# Patient Record
Sex: MALE | Race: WHITE | NOT HISPANIC OR LATINO
[De-identification: names, ages, dates, MRNs, and addresses within clinical notes are randomized per-mention and may not be internally consistent; named-entity substitution may affect disease eponyms.]

---

## 2019-02-05 ENCOUNTER — APPOINTMENT (OUTPATIENT)
Dept: GASTROENTEROLOGY | Facility: CLINIC | Age: 24
End: 2019-02-05
Payer: COMMERCIAL

## 2019-02-05 VITALS
HEART RATE: 80 BPM | DIASTOLIC BLOOD PRESSURE: 80 MMHG | SYSTOLIC BLOOD PRESSURE: 141 MMHG | WEIGHT: 160 LBS | BODY MASS INDEX: 21.2 KG/M2 | HEIGHT: 73 IN | OXYGEN SATURATION: 97 %

## 2019-02-05 PROBLEM — Z00.00 ENCOUNTER FOR PREVENTIVE HEALTH EXAMINATION: Status: ACTIVE | Noted: 2019-02-05

## 2019-02-05 PROCEDURE — 36415 COLL VENOUS BLD VENIPUNCTURE: CPT

## 2019-02-05 PROCEDURE — 99244 OFF/OP CNSLTJ NEW/EST MOD 40: CPT | Mod: 25

## 2019-02-05 NOTE — HISTORY OF PRESENT ILLNESS
[FreeTextEntry1] : 24m no sig PMH presents for 4-5wk hx sx starting w/ constipation, w/ some assoc BRBPR w/ BMs about mid 12/2018.  Pt stated to have persistent sx - started on linzess by PMD (minimal improvement in BMs) and then amitiza - leading to stomach cramps over the weekend, prompting call to be seen today.  No fever/n/v.  Has had loose stools w/ tenesmus in last 2-3d since starting amitiza.  Some LUQ and epigastric discomfort over same time.  No n/v.  States he was going through a period of stress about same time (mother sick) and started having more EtOH at college.  No melena, NSAIDs.  \par \par Soc:  no tobacco or significant EtOH\par FHx: no FHx GI malignancy or IBD\par \par ROS:\par Constitutional:: no weight loss, fevers\par ENT: no deafness\par Eyes: not blind\par Neck: no LN\par Chest: no dyspnea/cough\par Cardiac: no chest pain\par Vascular: no leg swelling\par GI: no abdominal pain, nausea, vomiting, diarrhea, constipation, rectal bleeding, dysphagia, melena unless otherwise noted in HPI\par : no dysuria, dark urine\par Skin: no rashes, jaundice\par Heme: no bleeding\par \par Px: (VS noted below)\par General: NAD\par Eyes: anicteric\par Oropharynx:  clear\par Neck: no LN\par Chest: normal respiratory effort\par CVS: regular\par Abd: soft, NT, ND, +BS, no HSM\par Ext: no atrophy\par Neuro: grossly nonfocal\par Rectal: mucus/blood.  No fissure/hemorrhoids.

## 2019-02-05 NOTE — CONSULT LETTER
[Dear  ___] : Dear  [unfilled], [Consult Letter:] : I had the pleasure of evaluating your patient, [unfilled]. [Please see my note below.] : Please see my note below. [Consult Closing:] : Thank you very much for allowing me to participate in the care of this patient.  If you have any questions, please do not hesitate to contact me. [Sincerely,] : Sincerely, [FreeTextEntry3] : Manuelito Parnell MD\par

## 2019-02-05 NOTE — ASSESSMENT
[FreeTextEntry1] : 1) rectal bleed - possibly hemorrhoidal - from  constipation - though now sx seem more like proctitis, as no findings on external exam.  Tx course w/ bowel regimen may also be contributing to bleeding.  Will check labs and consider colonoscopy - hold off on txing constipation of diarrhea until his course is more reliably not tx-related.\par \par 2) epigastric pain - will start ppi for now, though suspect related to constipation hx

## 2019-02-06 LAB
BASOPHILS # BLD AUTO: 0.06 K/UL
BASOPHILS NFR BLD AUTO: 1.3 %
EOSINOPHIL # BLD AUTO: 0.16 K/UL
EOSINOPHIL NFR BLD AUTO: 3.4 %
HCT VFR BLD CALC: 45.6 %
HGB BLD-MCNC: 14.9 G/DL
IMM GRANULOCYTES NFR BLD AUTO: 0.4 %
LYMPHOCYTES # BLD AUTO: 1.59 K/UL
LYMPHOCYTES NFR BLD AUTO: 34 %
MAN DIFF?: NORMAL
MCHC RBC-ENTMCNC: 28.7 PG
MCHC RBC-ENTMCNC: 32.7 GM/DL
MCV RBC AUTO: 87.9 FL
MONOCYTES # BLD AUTO: 0.61 K/UL
MONOCYTES NFR BLD AUTO: 13.1 %
NEUTROPHILS # BLD AUTO: 2.23 K/UL
NEUTROPHILS NFR BLD AUTO: 47.8 %
PLATELET # BLD AUTO: 178 K/UL
RBC # BLD: 5.19 M/UL
RBC # FLD: 13.3 %
WBC # FLD AUTO: 4.67 K/UL

## 2019-02-07 LAB
ALBUMIN SERPL ELPH-MCNC: 4.6 G/DL
ALP BLD-CCNC: 79 U/L
ALT SERPL-CCNC: 16 U/L
ANION GAP SERPL CALC-SCNC: 14 MMOL/L
AST SERPL-CCNC: 22 U/L
BILIRUB SERPL-MCNC: 0.5 MG/DL
BUN SERPL-MCNC: 12 MG/DL
CALCIUM SERPL-MCNC: 9.9 MG/DL
CHLORIDE SERPL-SCNC: 102 MMOL/L
CO2 SERPL-SCNC: 27 MMOL/L
CREAT SERPL-MCNC: 0.85 MG/DL
ERYTHROCYTE [SEDIMENTATION RATE] IN BLOOD BY WESTERGREN METHOD: 22 MM/HR
GLUCOSE SERPL-MCNC: 91 MG/DL
IGA SER QL IEP: 262 MG/DL
LPL SERPL-CCNC: 21 U/L
POTASSIUM SERPL-SCNC: 4.5 MMOL/L
PROT SERPL-MCNC: 7.6 G/DL
SODIUM SERPL-SCNC: 143 MMOL/L

## 2019-02-11 LAB
GLIADIN IGA SER QL: <5 UNITS
GLIADIN IGG SER QL: <5 UNITS
GLIADIN PEPTIDE IGA SER-ACNC: NEGATIVE
GLIADIN PEPTIDE IGG SER-ACNC: NEGATIVE
TTG IGA SER IA-ACNC: <5 UNITS
TTG IGA SER-ACNC: NEGATIVE
TTG IGG SER IA-ACNC: <5 UNITS
TTG IGG SER IA-ACNC: NEGATIVE

## 2019-02-25 ENCOUNTER — CLINICAL ADVICE (OUTPATIENT)
Age: 24
End: 2019-02-25

## 2019-03-15 ENCOUNTER — APPOINTMENT (OUTPATIENT)
Dept: GASTROENTEROLOGY | Facility: CLINIC | Age: 24
End: 2019-03-15
Payer: COMMERCIAL

## 2019-03-15 VITALS
HEIGHT: 73 IN | SYSTOLIC BLOOD PRESSURE: 130 MMHG | DIASTOLIC BLOOD PRESSURE: 70 MMHG | BODY MASS INDEX: 20.54 KG/M2 | WEIGHT: 155 LBS | HEART RATE: 80 BPM

## 2019-03-15 PROCEDURE — 99213 OFFICE O/P EST LOW 20 MIN: CPT | Mod: 25

## 2019-03-15 PROCEDURE — 36415 COLL VENOUS BLD VENIPUNCTURE: CPT

## 2019-03-15 NOTE — ASSESSMENT
[FreeTextEntry1] : 1) rectal bleed - possibly hemorrhoidal - from  constipation - improving.  Can't r/o procitis/colitis, though this would likely persist.  Will repeat labs and if sx cont resolved, will hold off on colonoscopy.\par \par 2) epigastric pain - resolved

## 2019-03-15 NOTE — HISTORY OF PRESENT ILLNESS
[FreeTextEntry1] : 24m no sig PMH presents seen last month for constipation/brbpr off/on x 1mo - assoc w/ vague abd cramps, relieved by BMs - MARQUIS was unremarkable at that time.  He had some loose stools after tx by PMD w/ linzess.  CBC, CMP, lipase, celiac abs all unremarkable at that time, sed rate 20s.  We had discussed colonscopy to ensure no proctitis/colitis, but deferred for trial of high fiber diet, as he was otherwise slowly improving.  He has since improved more and he has had cessation of any bleeding in the last wk.  no further abd pains.  No melena/wt loss/fevers.  No joint pains/rashes.\par \par Stated last visit he was going through a period of stress about same time (mother sick) and started having more EtOH at college.  No melena, NSAIDs.  \par \par Soc:  no tobacco or significant EtOH anymore\par FHx: no FHx GI malignancy or IBD\par \par ROS:\par Constitutional:: no weight loss, fevers\par ENT: no deafness\par Eyes: not blind\par Neck: no LN\par Chest: no dyspnea/cough\par Cardiac: no chest pain\par Vascular: no leg swelling\par GI: no abdominal pain, nausea, vomiting, diarrhea, constipation, rectal bleeding, dysphagia, melena unless otherwise noted in HPI\par : no dysuria, dark urine\par Skin: no rashes, jaundice\par Heme: no bleeding\par \par Px: (VS noted below)\par General: NAD\par Eyes: anicteric\par Oropharynx:  clear\par Neck: no LN\par Chest: normal respiratory effort\par CVS: regular\par Abd: soft, NT, ND, +BS, no HSM\par Ext: no atrophy\par Neuro: grossly nonfocal

## 2019-03-19 LAB
BASOPHILS # BLD AUTO: 0.07 K/UL
BASOPHILS NFR BLD AUTO: 1.4 %
EOSINOPHIL # BLD AUTO: 0.09 K/UL
EOSINOPHIL NFR BLD AUTO: 1.8 %
ERYTHROCYTE [SEDIMENTATION RATE] IN BLOOD BY WESTERGREN METHOD: 10 MM/HR
HCT VFR BLD CALC: 43.7 %
HGB BLD-MCNC: 13.8 G/DL
IMM GRANULOCYTES NFR BLD AUTO: 0.2 %
LYMPHOCYTES # BLD AUTO: 1.49 K/UL
LYMPHOCYTES NFR BLD AUTO: 29.7 %
MAN DIFF?: NORMAL
MCHC RBC-ENTMCNC: 29.1 PG
MCHC RBC-ENTMCNC: 31.6 GM/DL
MCV RBC AUTO: 92.2 FL
MONOCYTES # BLD AUTO: 0.53 K/UL
MONOCYTES NFR BLD AUTO: 10.6 %
NEUTROPHILS # BLD AUTO: 2.82 K/UL
NEUTROPHILS NFR BLD AUTO: 56.3 %
PLATELET # BLD AUTO: 206 K/UL
RBC # BLD: 4.74 M/UL
RBC # FLD: 13 %
WBC # FLD AUTO: 5.01 K/UL

## 2020-01-29 ENCOUNTER — APPOINTMENT (OUTPATIENT)
Dept: GASTROENTEROLOGY | Facility: CLINIC | Age: 25
End: 2020-01-29
Payer: COMMERCIAL

## 2020-01-29 VITALS
HEART RATE: 67 BPM | HEIGHT: 73 IN | SYSTOLIC BLOOD PRESSURE: 110 MMHG | DIASTOLIC BLOOD PRESSURE: 80 MMHG | WEIGHT: 155 LBS | BODY MASS INDEX: 20.54 KG/M2

## 2020-01-29 PROCEDURE — 99214 OFFICE O/P EST MOD 30 MIN: CPT

## 2020-01-29 NOTE — ASSESSMENT
[FreeTextEntry1] : 1) rectal bleed - possibly hemorrhoidal - in light of persistence, will plan colonoscopy.  \par \par 2) Constipation - reviewed dietary and lifestyle modifications, including increased fluid, fiber intake, as well as habituation / following urge to defecate, cutting back on bread/pasta, and to consider starting fiber supplement (eg.,  psyllium)\par \par 3) epigastric pain - now more LUQ - trial ppi and egd at colonoscopy if not improving.

## 2020-01-29 NOTE — HISTORY OF PRESENT ILLNESS
[FreeTextEntry1] : 25m seen in past for constipation/brbpr off/on x 1mo - labs, sed rate celiac, CMET, CBC, unrevealing exc transient marginally elevated sed rate - here for f/u.\par \par We had deferred colonoscopy last visit b/c sx resolved.  Now states sx persist, daily with brbpr, no anal pain, but LUQ pain off/on worse w/ oral intake, w/o any NSAID hx.  No fever/wt loss.  BMs 3-5 formed/day, incomplete. MARQUIS at prior visit unremarkable.  No melena/wt loss/fevers.  No joint pains/rashes.\par Starting new job in a week.\par \par Soc:  no tobacco or significant EtOH anymore\par FHx: no FHx GI malignancy or IBD\par \par ROS:\par Constitutional:: no weight loss, fevers\par ENT: no deafness\par Eyes: not blind\par Neck: no LN\par Chest: no dyspnea/cough\par Cardiac: no chest pain\par Vascular: no leg swelling\par GI: no abdominal pain, nausea, vomiting, diarrhea, constipation, rectal bleeding, dysphagia, melena unless otherwise noted in HPI\par : no dysuria, dark urine\par Skin: no rashes, jaundice\par Heme: no bleeding\par \par Px: (VS noted below)\par General: NAD\par Eyes: anicteric\par Oropharynx:  clear\par Neck: no LN\par Chest: normal respiratory effort\par CVS: regular\par Abd: soft, NT, ND, +BS, no HSM\par Ext: no atrophy\par Neuro: grossly nonfocal

## 2020-02-03 ENCOUNTER — APPOINTMENT (OUTPATIENT)
Dept: GASTROENTEROLOGY | Facility: HOSPITAL | Age: 25
End: 2020-02-03

## 2020-03-15 ENCOUNTER — RESULT REVIEW (OUTPATIENT)
Age: 25
End: 2020-03-15

## 2020-03-16 ENCOUNTER — APPOINTMENT (OUTPATIENT)
Dept: GASTROENTEROLOGY | Facility: HOSPITAL | Age: 25
End: 2020-03-16

## 2020-03-26 ENCOUNTER — NON-APPOINTMENT (OUTPATIENT)
Age: 25
End: 2020-03-26

## 2020-08-28 ENCOUNTER — LABORATORY RESULT (OUTPATIENT)
Age: 25
End: 2020-08-28

## 2020-08-31 ENCOUNTER — TRANSCRIPTION ENCOUNTER (OUTPATIENT)
Age: 25
End: 2020-08-31

## 2020-08-31 ENCOUNTER — APPOINTMENT (OUTPATIENT)
Dept: GASTROENTEROLOGY | Facility: CLINIC | Age: 25
End: 2020-08-31
Payer: COMMERCIAL

## 2020-08-31 ENCOUNTER — APPOINTMENT (OUTPATIENT)
Dept: BARIATRICS | Facility: CLINIC | Age: 25
End: 2020-08-31
Payer: COMMERCIAL

## 2020-08-31 DIAGNOSIS — K59.00 CONSTIPATION, UNSPECIFIED: ICD-10-CM

## 2020-08-31 PROCEDURE — 99214 OFFICE O/P EST MOD 30 MIN: CPT | Mod: 95

## 2020-08-31 PROCEDURE — 99213 OFFICE O/P EST LOW 20 MIN: CPT | Mod: 95

## 2020-08-31 NOTE — ASSESSMENT
[FreeTextEntry1] : -proctitis - cont canasa suppository as he feels he is responding.  Reviewed my concern RE: wt loss portending more significant inflammation - not eager to step up to enema or oral mesalamine, though that was my initial recommendation.  Plan for now is to see how he does in next few days OFF imodium and if not convincingly improving or gaining wt, will start enema +/- oral tx.

## 2020-08-31 NOTE — HISTORY OF PRESENT ILLNESS
[Home] : at home, [unfilled] , at the time of the visit. [Medical Office: ([facility])___] : at the medical office located in  [Verbal consent obtained from patient] : the patient, [unfilled] [FreeTextEntry1] : A verbal consent was previously obtained from the patient for this specific telehealth visit (see attached document). Visit done from my office in [place], [place].\par \par \par Time started: 430p\par Time ended: 4:45p\par Total time (minutes): 25  and > 50% of the time spent in the encounter involved counseling and coordination of care for any or all of the diagnoses submitted \par \par CHIEF COMPLAINT: \par proctitis\par \par HPI:  \par 25m proctitis, for f/u.\par \par Initial w/u  '19 - labs, sed rate celiac, CMET, CBC, unrevealing exc transient marginally elevated sed rate - then recurred later in year, - LUQ pain f/on worse w/ oral intake, w/o any NSAID hx, No fever/wt loss. BMs 3-5 formed/day, incomplete. Ultimately, colonoscopy w/ proctitis dx 3/2020.  Did well on canasa.  Then pandemic.  Ran out meds.  Sx recurred 2-3 wks ago.  Came for labs last wk:\par 8/31/20 cbc, normal , ESR\par \par Had"rough weekend" but feels like he turned corner today.  However, did take imodium.  No fevers.\par +10 lb wt loss in 2 wks prior to starting tx.\par \par \par Soc:  no tobacco or significant EtOH\par FHx: no FHx GI malignancy or IBD\par \par ROS:\par Constitutional:: no weight loss, fevers\par ENT: no deafness\par Eyes: not blind\par Neck: no LN\par Chest: no dyspnea/cough\par Cardiac: no chest pain\par Vascular: no leg swelling\par GI: no abdominal pain, nausea, vomiting, diarrhea, constipation, rectal bleeding, dysphagia, melena unless otherwise noted in HPI\par : no dysuria, dark urine\par Skin: no rashes, jaundice\par Heme: no bleeding\par Endocrine: no DM unless otherwise stated in HPI\par \par Px: telehealth.  NAD\par \par Labs/imaging/prior endoscopic results reviewed to the extent available and noted in HP.  IA verbal consent was previously obtained from the patient for this specific telehealth visit (see attached document). Visit done from my office in [place], [place].   Pt at home. Others present: none. Privacy concerns addressed.\par \par

## 2020-09-01 VITALS — BODY MASS INDEX: 18.95 KG/M2 | HEIGHT: 73 IN | WEIGHT: 143 LBS

## 2020-09-01 PROBLEM — K59.00 CONSTIPATION, UNSPECIFIED CONSTIPATION TYPE: Status: ACTIVE | Noted: 2020-01-29

## 2020-09-08 ENCOUNTER — APPOINTMENT (OUTPATIENT)
Dept: BARIATRICS | Facility: CLINIC | Age: 25
End: 2020-09-08
Payer: COMMERCIAL

## 2020-09-08 PROCEDURE — 99213 OFFICE O/P EST LOW 20 MIN: CPT | Mod: 95

## 2020-09-11 ENCOUNTER — APPOINTMENT (OUTPATIENT)
Dept: GASTROENTEROLOGY | Facility: CLINIC | Age: 25
End: 2020-09-11
Payer: COMMERCIAL

## 2020-09-11 PROCEDURE — 99214 OFFICE O/P EST MOD 30 MIN: CPT | Mod: 95

## 2020-09-11 NOTE — HISTORY OF PRESENT ILLNESS
[Medical Office: ([facility])___] : at the medical office located in  [Home] : at home, [unfilled] , at the time of the visit. [FreeTextEntry1] : A verbal consent was previously obtained from the patient for this specific telehealth visit (see attached document). Visit done from my office in [place], [place].\par \par \par Time started: 410\par Time ended: 424\par Total time (minutes): 28  and > 50% of the time spent in the encounter involved counseling and coordination of care for any or all of the diagnoses submitted \par \par CHIEF COMPLAINT: \par proctitis\par \par HPI: \par 25m proctitis, for f/u.\par \par Sx not improved - still 10 trips to bathroom/day.  Diarrhea.  Another 5 trips w/ tenesmus. Some bleeding, no fevers, cramps w/ all bms.  Feels no weaker than last wk.  Able to eat more, drinking.  Has not lost more wt.  No dizziness/LH.  + Nocturnal BMs now.\par \par Initial w/u '19 - labs, sed rate celiac, CMET, CBC, unrevealing exc transient marginally elevated sed rate - then recurred later in year, - LUQ pain f/on worse w/ oral intake, w/o any NSAID hx, No fever/wt loss. BMs 3-5 formed/day, incomplete. Ultimately, colonoscopy w/ proctitis dx 3/2020. Did well on canasa. Then pandemic. Ran out meds. Sx recurred 3-4  wks ago. Came for labs 8/28/20 cbc, normal , ESR 32\par When we spoke 2 wks ago, felt like was turning corner back on suppositories and declined escalation of tx. \par \par Soc:  no tobacco or significant EtOH\par FHx: no FHx GI malignancy or IBD\par \par ROS:\par Constitutional:: no weight loss, fevers\par ENT: no deafness\par Eyes: not blind\par Neck: no LN\par Chest: no dyspnea/cough\par Cardiac: no chest pain\par Vascular: no leg swelling\par GI: no abdominal pain, nausea, vomiting, diarrhea, constipation, rectal bleeding, dysphagia, melena unless otherwise noted in HPI\par : no dysuria, dark urine\par Skin: no rashes, jaundice\par Heme: no bleeding\par Endocrine: no DM unless otherwise stated in HPI\par \par Px: telehealth.  NAD\par \par Labs/imaging/prior endoscopic results reviewed to the extent available and noted in HP.  IA verbal consent was previously obtained from the patient for this specific telehealth visit (see attached document). Visit done from my office in [place], [place].   Pt at home. Others present: none. Privacy concerns addressed.\par \par

## 2020-09-11 NOTE — ASSESSMENT
[FreeTextEntry1] : - colitis - suspect extension of involvement proximally.  Advised prednisone in light of severity of sx and now willing to escalate tx. Advised ER if no improvement in next 24hours (I called pt back with this recommendation).  Reviewed risks, benefits, alternatives to steroid treatment, including DM, cataracts, osteoporosis, hip fracture, AVN, weight gain, adrenal insufficiency, immunosuppression/infection, HTN, PUD, among others.  Patient expressed understanding and wished to proceed.  Encouraged fluids - drinking pedialyte. \par -f/u next wk

## 2020-09-15 ENCOUNTER — APPOINTMENT (OUTPATIENT)
Dept: BARIATRICS | Facility: CLINIC | Age: 25
End: 2020-09-15
Payer: COMMERCIAL

## 2020-09-15 PROCEDURE — 99213 OFFICE O/P EST LOW 20 MIN: CPT | Mod: 95

## 2020-09-16 ENCOUNTER — LABORATORY RESULT (OUTPATIENT)
Age: 25
End: 2020-09-16

## 2020-09-16 ENCOUNTER — APPOINTMENT (OUTPATIENT)
Dept: GASTROENTEROLOGY | Facility: CLINIC | Age: 25
End: 2020-09-16
Payer: COMMERCIAL

## 2020-09-16 VITALS
WEIGHT: 141 LBS | HEART RATE: 92 BPM | DIASTOLIC BLOOD PRESSURE: 60 MMHG | HEIGHT: 73 IN | BODY MASS INDEX: 18.69 KG/M2 | TEMPERATURE: 97.6 F | OXYGEN SATURATION: 99 % | SYSTOLIC BLOOD PRESSURE: 130 MMHG

## 2020-09-16 VITALS — HEIGHT: 73 IN | WEIGHT: 141 LBS | BODY MASS INDEX: 18.69 KG/M2

## 2020-09-16 PROCEDURE — 99214 OFFICE O/P EST MOD 30 MIN: CPT | Mod: 25

## 2020-09-16 PROCEDURE — 36415 COLL VENOUS BLD VENIPUNCTURE: CPT

## 2020-09-16 NOTE — HISTORY OF PRESENT ILLNESS
[FreeTextEntry1] : 25m UC proctitis dx 3/2020 f/u. \par \par UC proctitis dx 3/2020\par -did well on canasa suppository - ran out then sx flare - not responding to canasa (declined oral tx at first) with BMs >15x/d w/ bleeding, wt loss, so started prednisone last wk - BMs down to 5x/d, no nocturnal BMs, and cramping inAMs only.  Still some brbpr.  No fevers.  Some wt gained back, but still perhaps 10-15lbs below baseline. \par \par Soc:  no tobacco or significant EtOH\par FHx: no FHx GI malignancy or IBD\par \par ROS:\par Constitutional:: no weight loss, fevers\par ENT: no deafness\par Eyes: not blind\par Neck: no LN\par Chest: no dyspnea/cough\par Cardiac: no chest pain\par Vascular: no leg swelling\par GI: no abdominal pain, nausea, vomiting, diarrhea, constipation, rectal bleeding, dysphagia, melena unless otherwise noted in HPI\par : no dysuria, dark urine\par Skin: no rashes, jaundice\par Heme: no bleeding\par Endocrine: no DM unless otherwise stated in HPI\par \par Px: (VS noted below)\par General: NAD\par Eyes: anicteric\par Oropharynx:  clear\par Neck: no LN\par Chest: normal respiratory effort\par CVS: regular\par Abd: soft, NT, ND, +BS, no HSM\par Ext: no atrophy\par Neuro: grossly nonfocal\par \par Labs/imaging/prior endoscopic results reviewed to the extent available and noted in HPI\par \par \par \par \par Soc:  no tobacco or significant EtOH anymore\par FHx: no FHx GI malignancy or IBD\par \par ROS:\par Constitutional:: no weight loss, fevers\par ENT: no deafness\par Eyes: not blind\par Neck: no LN\par Chest: no dyspnea/cough\par Cardiac: no chest pain\par Vascular: no leg swelling\par GI: no abdominal pain, nausea, vomiting, diarrhea, constipation, rectal bleeding, dysphagia, melena unless otherwise noted in HPI\par : no dysuria, dark urine\par Skin: no rashes, jaundice\par Heme: no bleeding\par \par Px: (VS noted below)\par General: NAD\par Eyes: anicteric\par Oropharynx:  clear\par Neck: no LN\par Chest: normal respiratory effort\par CVS: regular\par Abd: soft, NT, ND, +BS, no HSM\par Ext: no atrophy\par Neuro: grossly nonfocal

## 2020-09-16 NOTE — ASSESSMENT
[FreeTextEntry1] : -proctitis (possibly extending higher now since not responding to tx and sx seem to have been severe) - steroids to start tapering end of this wk.  Will start lialda 4.8g/d.    Monitor wt/check labs and f/u 1month to reassess.  \par

## 2020-09-18 LAB
ALBUMIN SERPL ELPH-MCNC: 3.6 G/DL
ALP BLD-CCNC: 71 U/L
ALT SERPL-CCNC: 40 U/L
ANION GAP SERPL CALC-SCNC: 9 MMOL/L
AST SERPL-CCNC: 24 U/L
BASOPHILS # BLD AUTO: 0.16 K/UL
BASOPHILS NFR BLD AUTO: 0.9 %
BILIRUB SERPL-MCNC: <0.2 MG/DL
BUN SERPL-MCNC: 9 MG/DL
CALCIUM SERPL-MCNC: 8.7 MG/DL
CHLORIDE SERPL-SCNC: 103 MMOL/L
CO2 SERPL-SCNC: 26 MMOL/L
CREAT SERPL-MCNC: 0.8 MG/DL
EOSINOPHIL # BLD AUTO: 0.16 K/UL
EOSINOPHIL NFR BLD AUTO: 0.9 %
ERYTHROCYTE [SEDIMENTATION RATE] IN BLOOD BY WESTERGREN METHOD: 69 MM/HR
GLUCOSE SERPL-MCNC: 95 MG/DL
HCT VFR BLD CALC: 36.5 %
HGB BLD-MCNC: 11 G/DL
LYMPHOCYTES # BLD AUTO: 1.08 K/UL
LYMPHOCYTES NFR BLD AUTO: 6.1 %
MAN DIFF?: NORMAL
MCHC RBC-ENTMCNC: 27.1 PG
MCHC RBC-ENTMCNC: 30.1 GM/DL
MCV RBC AUTO: 89.9 FL
MONOCYTES # BLD AUTO: 0.76 K/UL
MONOCYTES NFR BLD AUTO: 4.3 %
NEUTROPHILS # BLD AUTO: 15.48 K/UL
NEUTROPHILS NFR BLD AUTO: 87.8 %
PLATELET # BLD AUTO: 384 K/UL
POTASSIUM SERPL-SCNC: 3.7 MMOL/L
PROT SERPL-MCNC: 6.6 G/DL
RBC # BLD: 4.06 M/UL
RBC # FLD: 12.8 %
SODIUM SERPL-SCNC: 138 MMOL/L
WBC # FLD AUTO: 17.63 K/UL

## 2020-09-22 ENCOUNTER — APPOINTMENT (OUTPATIENT)
Dept: BARIATRICS | Facility: CLINIC | Age: 25
End: 2020-09-22
Payer: COMMERCIAL

## 2020-09-22 PROCEDURE — 99213 OFFICE O/P EST LOW 20 MIN: CPT | Mod: 95

## 2020-09-23 VITALS — WEIGHT: 142 LBS | HEIGHT: 73 IN | BODY MASS INDEX: 18.82 KG/M2

## 2020-09-29 ENCOUNTER — APPOINTMENT (OUTPATIENT)
Dept: BARIATRICS | Facility: CLINIC | Age: 25
End: 2020-09-29
Payer: COMMERCIAL

## 2020-09-29 VITALS — HEIGHT: 73 IN | WEIGHT: 143 LBS | BODY MASS INDEX: 18.95 KG/M2

## 2020-09-29 PROCEDURE — 99213 OFFICE O/P EST LOW 20 MIN: CPT | Mod: 95

## 2020-10-05 ENCOUNTER — APPOINTMENT (OUTPATIENT)
Dept: BARIATRICS | Facility: CLINIC | Age: 25
End: 2020-10-05
Payer: COMMERCIAL

## 2020-10-05 PROCEDURE — 99213 OFFICE O/P EST LOW 20 MIN: CPT | Mod: 95

## 2020-10-12 ENCOUNTER — APPOINTMENT (OUTPATIENT)
Dept: GASTROENTEROLOGY | Facility: CLINIC | Age: 25
End: 2020-10-12
Payer: COMMERCIAL

## 2020-10-12 VITALS
SYSTOLIC BLOOD PRESSURE: 150 MMHG | HEART RATE: 109 BPM | WEIGHT: 143 LBS | BODY MASS INDEX: 18.95 KG/M2 | DIASTOLIC BLOOD PRESSURE: 70 MMHG | HEIGHT: 73 IN

## 2020-10-12 PROCEDURE — 36415 COLL VENOUS BLD VENIPUNCTURE: CPT

## 2020-10-12 PROCEDURE — 99214 OFFICE O/P EST MOD 30 MIN: CPT | Mod: 25

## 2020-10-12 NOTE — HISTORY OF PRESENT ILLNESS
[FreeTextEntry1] : 25m UC proctitis dx 3/2020 f/u. \par \par Seemed to be down to 4-5BMs/d on pred 40-30, but increased to 7x/d at 20--> 10mg.  Some blood at end of BM, with some form to stool.  Rare pain - relieved w/ BM.  Not losing, but not regaining wt.\par \par UC proctitis dx 3/2020\par -did well on canasa suppository - ran out then sx flare - not responding to canasa (declined oral tx at first) with BMs >15x/d w/ bleeding, wt loss, so started prednisone-->  BMs down to 5x/d, but never full remission.  Some wt gained back, but still perhaps 10-15lbs below baseline. \par \par Soc:  no tobacco or significant EtOH\par FHx: no FHx GI malignancy or IBD\par \par ROS:\par Constitutional:: no weight loss, fevers\par ENT: no deafness\par Eyes: not blind\par Neck: no LN\par Chest: no dyspnea/cough\par Cardiac: no chest pain\par Vascular: no leg swelling\par GI: no abdominal pain, nausea, vomiting, diarrhea, constipation, rectal bleeding, dysphagia, melena unless otherwise noted in HPI\par : no dysuria, dark urine\par Skin: no rashes, jaundice\par Heme: no bleeding\par Endocrine: no DM unless otherwise stated in HPI\par \par Px: (VS noted below)\par General: NAD\par Eyes: anicteric\par Oropharynx:  clear\par Neck: no LN\par Chest: normal respiratory effort\par CVS: regular\par Abd: soft, NT, ND, +BS, no HSM\par Ext: no atrophy\par Neuro: grossly nonfocal\par \par Labs/imaging/prior endoscopic results reviewed to the extent available and noted in HPI\par \par

## 2020-10-12 NOTE — ASSESSMENT
[FreeTextEntry1] : -proctitis (Likely extending higher now since not responding to tx and sx seem to have been severe) - steroids - seem to be worsening during steroid taper.  Will return to 40mg/d and try slower taper - perhaps q2 wks but will to do f/u 1 wk.  In anticipation of possibly adding another medication will check Quantiferon, TPMT, etc, as below.\par Cont.  lialda 4.8g/d.    \par

## 2020-10-13 ENCOUNTER — APPOINTMENT (OUTPATIENT)
Dept: BARIATRICS | Facility: CLINIC | Age: 25
End: 2020-10-13
Payer: SELF-PAY

## 2020-10-13 VITALS — WEIGHT: 143 LBS | BODY MASS INDEX: 18.95 KG/M2 | HEIGHT: 73 IN

## 2020-10-13 LAB
ALBUMIN SERPL ELPH-MCNC: 4.1 G/DL
ALP BLD-CCNC: 84 U/L
ALT SERPL-CCNC: 22 U/L
ANION GAP SERPL CALC-SCNC: 12 MMOL/L
AST SERPL-CCNC: 15 U/L
BASOPHILS # BLD AUTO: 0.09 K/UL
BASOPHILS NFR BLD AUTO: 0.6 %
BILIRUB SERPL-MCNC: 0.2 MG/DL
BUN SERPL-MCNC: 13 MG/DL
CALCIUM SERPL-MCNC: 9.6 MG/DL
CHLORIDE SERPL-SCNC: 101 MMOL/L
CO2 SERPL-SCNC: 27 MMOL/L
CREAT SERPL-MCNC: 1.12 MG/DL
EOSINOPHIL # BLD AUTO: 0.22 K/UL
EOSINOPHIL NFR BLD AUTO: 1.5 %
ERYTHROCYTE [SEDIMENTATION RATE] IN BLOOD BY WESTERGREN METHOD: 53 MM/HR
GLUCOSE SERPL-MCNC: 101 MG/DL
HBV SURFACE AB SER QL: NONREACTIVE
HBV SURFACE AG SER QL: NONREACTIVE
HCT VFR BLD CALC: 32.8 %
HGB BLD-MCNC: 9.7 G/DL
IMM GRANULOCYTES NFR BLD AUTO: 0.6 %
LPL SERPL-CCNC: 32 U/L
LYMPHOCYTES # BLD AUTO: 0.77 K/UL
LYMPHOCYTES NFR BLD AUTO: 5.2 %
MAN DIFF?: NORMAL
MCHC RBC-ENTMCNC: 25.3 PG
MCHC RBC-ENTMCNC: 29.6 GM/DL
MCV RBC AUTO: 85.6 FL
MONOCYTES # BLD AUTO: 0.44 K/UL
MONOCYTES NFR BLD AUTO: 3 %
NEUTROPHILS # BLD AUTO: 13.15 K/UL
NEUTROPHILS NFR BLD AUTO: 89.1 %
PLATELET # BLD AUTO: 355 K/UL
POTASSIUM SERPL-SCNC: 4.7 MMOL/L
PROT SERPL-MCNC: 6.9 G/DL
RBC # BLD: 3.83 M/UL
RBC # FLD: 14 %
SODIUM SERPL-SCNC: 139 MMOL/L
WBC # FLD AUTO: 14.76 K/UL

## 2020-10-13 PROCEDURE — 97803 MED NUTRITION INDIV SUBSEQ: CPT | Mod: 95

## 2020-10-16 LAB
M TB IFN-G BLD-IMP: ABNORMAL
QUANTIFERON TB PLUS MITOGEN MINUS NIL: 0.01 IU/ML
QUANTIFERON TB PLUS NIL: 0.01 IU/ML
QUANTIFERON TB PLUS TB1 MINUS NIL: 0 IU/ML
QUANTIFERON TB PLUS TB2 MINUS NIL: 0 IU/ML

## 2020-10-19 ENCOUNTER — APPOINTMENT (OUTPATIENT)
Dept: GASTROENTEROLOGY | Facility: CLINIC | Age: 25
End: 2020-10-19
Payer: COMMERCIAL

## 2020-10-19 DIAGNOSIS — R10.13 EPIGASTRIC PAIN: ICD-10-CM

## 2020-10-19 DIAGNOSIS — G89.29 EPIGASTRIC PAIN: ICD-10-CM

## 2020-10-19 PROCEDURE — 99214 OFFICE O/P EST MOD 30 MIN: CPT | Mod: 95

## 2020-10-19 NOTE — ASSESSMENT
[FreeTextEntry1] : -proctitis (Likely extending higher now since not responding to tx and sx seem to have been severe) - steroids - seems to be steroid dependent, almost refractory? \par REC: cont pred 40mg/d.  ID eval ASAP for indeterminate quantiferon gold for clearance for remicade (regardless of TPMT, doubt we have 6wks to wait for 6MP effects.  Repeat labs this wk - will send rx for blood work.  Cont.  lialda 4.8g/d.    \par

## 2020-10-19 NOTE — HISTORY OF PRESENT ILLNESS
[Medical Office: ([facility])___] : at the medical office located in  [Home] : at home, [unfilled] , at the time of the visit. [FreeTextEntry1] : A verbal consent was previously obtained from the patient for this specific telehealth visit (see attached document). Visit done from my office in [place], [place].\par \par \par Time started: 3P\par Time ended: 3:15\par Total time (minutes): 28 and > 50% of the time spent in the encounter involved counseling and coordination of care for any or all of the diagnoses submitted \par \par 25m UC proctitis dx 3/2020 f/u. \par \par UC proctitis dx 3/2020\par -did well on canasa suppository - ran out then sx flare - not responding to canasa (declined oral tx at first) with BMs >15x/d w/ bleeding, wt loss, so started prednisone-->  BMs down to 5x/d.  Seemed to be down to 4-5BMs/d on pred 40-30, but increased to 7x/d at 20--> 10mg.   Last wk at visit, increased pred back to 40mg/d.  Lags: Hb 9.7 from 11; WBC 14.  HbsAg/Ab neg, lipase wnl, TPMT pending.  Quant gold indeterminate. CMET unremarkable.    Started iron.  ESR 32-->69--> 53 last wk.\par \par Since resumption of 40mg, still going 5-7x/d, not nocturnal.  Less freq blood,  pain improved.  Urgency.\par \par Going to work every day.  No fevers.\par \par Soc:  no tobacco or significant EtOH\par FHx: no FHx GI malignancy or IBD\par \par ROS:\par Constitutional:: no weight loss, fevers\par ENT: no deafness\par Eyes: not blind\par Neck: no LN\par Chest: no dyspnea/cough\par Cardiac: no chest pain\par Vascular: no leg swelling\par GI: no abdominal pain, nausea, vomiting, diarrhea, constipation, rectal bleeding, dysphagia, melena unless otherwise noted in HPI\par : no dysuria, dark urine\par Skin: no rashes, jaundice\par Heme: no bleeding\par Endocrine: no DM unless otherwise stated in HPI\par \par Px: telehealth.  NAD\par \par Labs/imaging/prior endoscopic results reviewed to the extent available and noted in HP.  IA verbal consent was previously obtained from the patient for this specific telehealth visit (see attached document). Visit done from my office in [place], [place].   Pt at home. Others present: none. Privacy concerns addressed.\par

## 2020-10-20 ENCOUNTER — APPOINTMENT (OUTPATIENT)
Dept: BARIATRICS | Facility: CLINIC | Age: 25
End: 2020-10-20
Payer: SELF-PAY

## 2020-10-20 LAB
TPMT COMMENT: NORMAL
TPMT GENE MUT ANL BLD/T: NORMAL
TPMT GENETICS TEST: NORMAL
TPMT INTERPRETATION: NORMAL

## 2020-10-20 PROCEDURE — 99213 OFFICE O/P EST LOW 20 MIN: CPT | Mod: 95

## 2020-10-21 ENCOUNTER — TRANSCRIPTION ENCOUNTER (OUTPATIENT)
Age: 25
End: 2020-10-21

## 2020-10-24 ENCOUNTER — RESULT REVIEW (OUTPATIENT)
Age: 25
End: 2020-10-24

## 2020-10-26 ENCOUNTER — NON-APPOINTMENT (OUTPATIENT)
Age: 25
End: 2020-10-26

## 2020-10-26 ENCOUNTER — RESULT REVIEW (OUTPATIENT)
Age: 25
End: 2020-10-26

## 2020-10-28 ENCOUNTER — NON-APPOINTMENT (OUTPATIENT)
Age: 25
End: 2020-10-28

## 2020-10-30 ENCOUNTER — APPOINTMENT (OUTPATIENT)
Dept: BARIATRICS | Facility: CLINIC | Age: 25
End: 2020-10-30
Payer: COMMERCIAL

## 2020-10-30 VITALS — WEIGHT: 143 LBS | BODY MASS INDEX: 18.95 KG/M2 | HEIGHT: 73 IN

## 2020-10-30 PROCEDURE — 99213 OFFICE O/P EST LOW 20 MIN: CPT | Mod: 95

## 2020-11-02 ENCOUNTER — APPOINTMENT (OUTPATIENT)
Dept: GASTROENTEROLOGY | Facility: CLINIC | Age: 25
End: 2020-11-02
Payer: COMMERCIAL

## 2020-11-02 VITALS
HEART RATE: 88 BPM | BODY MASS INDEX: 18.69 KG/M2 | OXYGEN SATURATION: 99 % | SYSTOLIC BLOOD PRESSURE: 116 MMHG | TEMPERATURE: 95.3 F | DIASTOLIC BLOOD PRESSURE: 76 MMHG | HEIGHT: 73 IN | WEIGHT: 141 LBS

## 2020-11-02 PROCEDURE — 99213 OFFICE O/P EST LOW 20 MIN: CPT | Mod: 25

## 2020-11-02 PROCEDURE — 36415 COLL VENOUS BLD VENIPUNCTURE: CPT

## 2020-11-02 PROCEDURE — 99072 ADDL SUPL MATRL&STAF TM PHE: CPT

## 2020-11-02 NOTE — ASSESSMENT
[FreeTextEntry1] : Cont.  lialda 4.8g/d.  Cont Remicade.  Cont. pred to taper.  Repeat iron infusion. Check labs today.  \par

## 2020-11-02 NOTE — HISTORY OF PRESENT ILLNESS
[Home] : at home, [unfilled] , at the time of the visit. [Medical Office: ([facility])___] : at the medical office located in  [FreeTextEntry1] : 25m UC proctitis dx 3/2020 f/u. \par s/p remicade 1 wk ago 1st dose, while steroids at 40mg/d - went fropm 10-15 BMs/d to 4-5 BMs / day.  SMall blood w/ 1st BM of day. No pain.  Not losing any more wt.  Went down to 30mg today.  No fever.  \par \par UC proctitis dx 3/2020\par -initially, did well on canasa suppository - ran out then sx flare - not responding to canasa (declined oral tx ) then progressed to steroid dependent --> remicade 1st dose 10/26 - with marked anemia as well, received iron (Hb 11s--> 9s--> 8s).  \par \par Pretesting:  HbsAg/Ab neg, lipase wnl, TPMT wnl.  Quant gold indeterminate. Saw Dr. Brewster--> cleared for remicade b/c Tb was neg (control failed). CMET unremarkable.    \par \par Soc:  no tobacco or significant EtOH\par FHx: no FHx GI malignancy or IBD\par \par ROS:\par Constitutional:: no weight loss, fevers\par ENT: no deafness\par Eyes: not blind\par Neck: no LN\par Chest: no dyspnea/cough\par Cardiac: no chest pain\par Vascular: no leg swelling\par GI: no abdominal pain, nausea, vomiting, diarrhea, constipation, rectal bleeding, dysphagia, melena unless otherwise noted in HPI\par : no dysuria, dark urine\par Skin: no rashes, jaundice\par Heme: no bleeding\par Endocrine: no DM unless otherwise stated in HPI\par \par Px: (VS noted below)\par General: NAD\par Eyes: anicteric\par Oropharynx:  clear\par Neck: no LN\par Chest: normal respiratory effort\par CVS: regular\par Abd: soft, NT, ND, +BS, no HSM\par Ext: no atrophy\par Neuro: grossly nonfocal\par \par Labs/imaging/prior endoscopic results reviewed to the extent available and noted in HPI\par

## 2020-11-03 LAB
BASOPHILS # BLD AUTO: 0.06 K/UL
BASOPHILS NFR BLD AUTO: 0.5 %
EOSINOPHIL # BLD AUTO: 0.09 K/UL
EOSINOPHIL NFR BLD AUTO: 0.7 %
HCT VFR BLD CALC: 31.4 %
HGB BLD-MCNC: 8.8 G/DL
IMM GRANULOCYTES NFR BLD AUTO: 0.8 %
LYMPHOCYTES # BLD AUTO: 0.94 K/UL
LYMPHOCYTES NFR BLD AUTO: 7.4 %
MAN DIFF?: NORMAL
MCHC RBC-ENTMCNC: 23.9 PG
MCHC RBC-ENTMCNC: 28 GM/DL
MCV RBC AUTO: 85.3 FL
MONOCYTES # BLD AUTO: 0.74 K/UL
MONOCYTES NFR BLD AUTO: 5.8 %
NEUTROPHILS # BLD AUTO: 10.76 K/UL
NEUTROPHILS NFR BLD AUTO: 84.8 %
PLATELET # BLD AUTO: 456 K/UL
RBC # BLD: 3.68 M/UL
RBC # FLD: 16.7 %
WBC # FLD AUTO: 12.69 K/UL

## 2020-11-09 ENCOUNTER — NON-APPOINTMENT (OUTPATIENT)
Age: 25
End: 2020-11-09

## 2020-11-10 ENCOUNTER — APPOINTMENT (OUTPATIENT)
Dept: GASTROENTEROLOGY | Facility: CLINIC | Age: 25
End: 2020-11-10
Payer: COMMERCIAL

## 2020-11-10 ENCOUNTER — RESULT REVIEW (OUTPATIENT)
Age: 25
End: 2020-11-10

## 2020-11-10 PROCEDURE — 99072 ADDL SUPL MATRL&STAF TM PHE: CPT

## 2020-11-10 PROCEDURE — 36415 COLL VENOUS BLD VENIPUNCTURE: CPT

## 2020-11-12 ENCOUNTER — APPOINTMENT (OUTPATIENT)
Dept: GASTROENTEROLOGY | Facility: CLINIC | Age: 25
End: 2020-11-12
Payer: COMMERCIAL

## 2020-11-12 ENCOUNTER — NON-APPOINTMENT (OUTPATIENT)
Age: 25
End: 2020-11-12

## 2020-11-12 ENCOUNTER — RESULT REVIEW (OUTPATIENT)
Age: 25
End: 2020-11-12

## 2020-11-12 DIAGNOSIS — K51.911 ULCERATIVE COLITIS, UNSPECIFIED WITH RECTAL BLEEDING: ICD-10-CM

## 2020-11-12 LAB
C DIFF TOX GENS STL QL NAA+PROBE: NORMAL
CDIFF BY PCR: NOT DETECTED

## 2020-11-12 PROCEDURE — 99443: CPT

## 2020-11-12 NOTE — ASSESSMENT
[FreeTextEntry1] : 1.  severe Ulcerative Colitis\par \par not responding to therapy...\par \par has already had two infusions of remicade\par \par patient is not responding to the remicade either\par \par he is now on 40 mg of prednisone, and he is also on topical treatment\par \par we have discussed hospitalization, and I am asking patient to meet me in the ER first, so i can evaluate him\par \par meet me in er at about nine am...\par \par More than 50% of the face to face time was devoted to counseling and /or coordination of care.  THis coordination of care may have included reviewing other medical notes and reports, and communicating with other health professionals\par

## 2020-11-12 NOTE — HISTORY OF PRESENT ILLNESS
[FreeTextEntry1] : TELEPHONIC VISIT;\par PATIENT OF DR UNGER\par CONSENT ON FILE;\par \par [facility] CALL\par SPOKE TO PATIENT AND HIS FATHER ED\par \par PATIENT HAS SYMPTOMS OVER TWO YEARS\par \par HE STARTED TO SEE BLOOD IN HIS BOWEL MOVEMENTS;\par \par AT THE TIME OF THE ORIGINAL COLON, IN MARCH, 2020, THE INFLAM PROCESS WENT UP TO APPROX 10 CM NL ABOVE\par \par AT THE TIME THERE WASNT MUCH DIARRHEA, NL STOOLS, BUT BLOOD.\par TENESMUS WAS ALSO PRESENT..\par \par THE FIRST TREATMENT, WAS CANASA OR MESALAMINE SUPPOS..\par \par ORIGINAL RESPONSE,  FIRST BM OF DAY...WAS IMPROVED,, BUT THEN BLOODY MUCOUS..DIDNT RESPOND COMPLETELY.\par \par THIS WAS PATTERN FOR FIRST FEW MONTHS.\par \par By Sept, having abdom pain..cramps..with urgency..and decreased appetite..\par more frequent trips to BR, five or six BM'a ...and then five or six BMs with bloody mucous\par \par the stools are liquid now, not before..last solid bm in august...and frequent nocturnal bms\par night and morning are the worst.\par \par the loose stools are not bloody, but at the end, leakage of blood and mucous..\par \par mesalamine was begun in aug or sept..1.2 g oral tabs, four per day..two and two\par \par also, started on mesalamine enemas, as of nov 9th\par \par Prednisone, begun on forty mg of Pred, begun on Oct 12, with tapering..on weekly basis, got down to 10 mg, improving for the first two weeks..stools improved but just slightly..\par \par stools got a little less frequent, with ongoing diarrhea.\par \par begun on Remicade, first infusion was october 26th, and another infustion on nov 9th, and iron withh both infusions\par \par back up to forty mg of prednison...\par \par also, two infusions of iron, with the remicade\par \par last hct was 28, hg was 8.5\par \par still having the pain, and the pain is worse, pretty similar, or worse ..\par \par weight has gone down, since august, from 155 pounds down to 134 pounds.\par \par \par

## 2020-11-13 ENCOUNTER — APPOINTMENT (OUTPATIENT)
Dept: GASTROENTEROLOGY | Facility: HOSPITAL | Age: 25
End: 2020-11-13

## 2022-01-09 ENCOUNTER — RESULT REVIEW (OUTPATIENT)
Age: 27
End: 2022-01-09

## 2022-01-10 ENCOUNTER — NON-APPOINTMENT (OUTPATIENT)
Age: 27
End: 2022-01-10

## 2022-01-10 ENCOUNTER — APPOINTMENT (OUTPATIENT)
Dept: GASTROENTEROLOGY | Facility: CLINIC | Age: 27
End: 2022-01-10
Payer: COMMERCIAL

## 2022-01-10 VITALS
OXYGEN SATURATION: 98 % | WEIGHT: 155 LBS | SYSTOLIC BLOOD PRESSURE: 120 MMHG | DIASTOLIC BLOOD PRESSURE: 80 MMHG | HEIGHT: 73 IN | TEMPERATURE: 98 F | BODY MASS INDEX: 20.54 KG/M2 | HEART RATE: 100 BPM

## 2022-01-10 DIAGNOSIS — K62.5 HEMORRHAGE OF ANUS AND RECTUM: ICD-10-CM

## 2022-01-10 PROCEDURE — 99214 OFFICE O/P EST MOD 30 MIN: CPT | Mod: 25

## 2022-01-10 PROCEDURE — 36415 COLL VENOUS BLD VENIPUNCTURE: CPT

## 2022-01-10 RX ORDER — MESALAMINE 1000 MG/1
1000 SUPPOSITORY RECTAL
Qty: 30 | Refills: 1 | Status: DISCONTINUED | COMMUNITY
Start: 2020-03-16 | End: 2022-01-10

## 2022-01-10 RX ORDER — MESALAMINE 1.2 G/1
1.2 TABLET, DELAYED RELEASE ORAL
Qty: 120 | Refills: 5 | Status: DISCONTINUED | COMMUNITY
Start: 2020-09-16 | End: 2022-01-10

## 2022-01-10 RX ORDER — PREDNISONE 10 MG/1
10 TABLET ORAL
Qty: 70 | Refills: 0 | Status: DISCONTINUED | COMMUNITY
Start: 2020-10-28 | End: 2022-01-10

## 2022-01-10 RX ORDER — OMEPRAZOLE 40 MG/1
40 CAPSULE, DELAYED RELEASE ORAL
Qty: 30 | Refills: 2 | Status: DISCONTINUED | COMMUNITY
Start: 2019-02-05 | End: 2022-01-10

## 2022-01-10 RX ORDER — PREDNISONE 10 MG/1
10 TABLET ORAL
Qty: 74 | Refills: 0 | Status: DISCONTINUED | COMMUNITY
Start: 2020-09-11 | End: 2022-01-10

## 2022-01-10 RX ORDER — MESALAMINE 4 G/60 ML
4 KIT RECTAL
Qty: 30 | Refills: 2 | Status: DISCONTINUED | COMMUNITY
Start: 2020-11-09 | End: 2022-01-10

## 2022-01-10 NOTE — HISTORY OF PRESENT ILLNESS
[FreeTextEntry1] : 27m UC/proctocolectomy/J-pouch who has done very well since his last surgery 4/2021 ileostomy takedown, with subsequent BMs 7-8x/day, but noted last wk lower abd / pelvic cramps and bloody stools.  His frequency increased slightly to 8-10x/d, small amounts, but no fevers.  Mild tenesmus. He called Dr. Ahuja - called in Rx for cipro and referred for pouchoscopy. His bleeding started 1/6, cipro started 1/7, and bleeding subsided 1/8, but pt states amount of blood was significant.  He denies dizziness/LH.  He states that he was anemic last fall at [facility] Dr. Aquino, but not sure counts.  He lives in [place] and called here 1/7 and I advised him to get a CBC at urgent care locally to ensure blood loss not significant. He did not go.  He came home to his parents 1/9 and I arranged a COVID pretest (negative) in anticipation of potential pouchoscopy.  Not taking NSAIDs.\par \par Noncompliant w/ iron supplements.  No other interval hx, illness, abx use.\par \par Prior IBD course:\par 3/2020 colonoscopy for rectal bleed: dx proctitis - initial response to canasa, ran out, flare, progressed to steroid dependence /colitis/anemia, then remicade by 10/2021, hospitalized w/ persistent sx despite remicade x2 and steroids ongoing - flex sig by Dr. Centeno 11/13/20: severe colitis--> xferred to [facility]: Dr. Negron/Dr. Jaden Ahuja surgery. (Dr. Ahuja: ([phone], [email or web address])\par -11/2020 colectomy/ileostomy -->re-op 11/2020 for peritonitis\par -2/2021 J-pouch formed\par -4/2021 ileostomy take-down\par \par Soc:  no tobacco or significant EtOH\par FHx: no FHx GI malignancy or IBD\par \par ROS:\par Constitutional:: no weight loss, fevers\par ENT: no deafness\par Eyes: not blind\par Neck: no LN\par Chest: no dyspnea/cough\par Cardiac: no chest pain\par Vascular: no leg swelling\par GI: no abdominal pain, nausea, vomiting, diarrhea, constipation, rectal bleeding, dysphagia, melena unless otherwise noted in HPI\par : no dysuria, dark urine\par Skin: no rashes, jaundice\par Heme: no bleeding\par Endocrine: no DM unless otherwise stated in HPI\par \par Px: (VS noted below)\par General: NAD\par Eyes: anicteric\par Oropharynx:  clear\par Neck: no LN\par Chest: normal respiratory effort\par CVS: regular\par Abd: soft, NT, ND, +BS, no HSM\par Ext: no atrophy\par Neuro: grossly nonfocal\par \par Labs/imaging/prior endoscopic results reviewed to the extent available and noted in HPI\par

## 2022-01-10 NOTE — ASSESSMENT
[FreeTextEntry1] : UC/colectomy/J-pouch - rectal bleed with small increase in baseline freq stools - seemed to respond to abx for pouchitis Rx'd by Dr. Ahuja.  Will clarify w/ pouchoscopy, labs as ordered would like to make sure he is not severely anemic - will request most recent labs from [facility] as well.  Cont cipro x 2 wks.

## 2022-01-11 ENCOUNTER — RESULT REVIEW (OUTPATIENT)
Age: 27
End: 2022-01-11

## 2022-01-11 LAB
ALBUMIN SERPL ELPH-MCNC: 4.3 G/DL
ALP BLD-CCNC: 90 U/L
ALT SERPL-CCNC: 38 U/L
ANION GAP SERPL CALC-SCNC: 13 MMOL/L
AST SERPL-CCNC: 28 U/L
BASOPHILS # BLD AUTO: 0.11 K/UL
BASOPHILS NFR BLD AUTO: 1 %
BILIRUB SERPL-MCNC: 0.3 MG/DL
BUN SERPL-MCNC: 12 MG/DL
CALCIUM SERPL-MCNC: 9.5 MG/DL
CHLORIDE SERPL-SCNC: 102 MMOL/L
CO2 SERPL-SCNC: 23 MMOL/L
CREAT SERPL-MCNC: 0.98 MG/DL
EOSINOPHIL # BLD AUTO: 0.09 K/UL
EOSINOPHIL NFR BLD AUTO: 0.8 %
ERYTHROCYTE [SEDIMENTATION RATE] IN BLOOD BY WESTERGREN METHOD: 120 MM/HR
FERRITIN SERPL-MCNC: 17 NG/ML
FOLATE SERPL-MCNC: 12.3 NG/ML
GLUCOSE SERPL-MCNC: 100 MG/DL
HCT VFR BLD CALC: 29.3 %
HGB BLD-MCNC: 8.4 G/DL
IMM GRANULOCYTES NFR BLD AUTO: 1.7 %
IRON SATN MFR SERPL: 3 %
IRON SERPL-MCNC: 10 UG/DL
LYMPHOCYTES # BLD AUTO: 1.21 K/UL
LYMPHOCYTES NFR BLD AUTO: 11.3 %
MAN DIFF?: NORMAL
MCHC RBC-ENTMCNC: 19.5 PG
MCHC RBC-ENTMCNC: 28.7 GM/DL
MCV RBC AUTO: 68 FL
MONOCYTES # BLD AUTO: 1.11 K/UL
MONOCYTES NFR BLD AUTO: 10.4 %
NEUTROPHILS # BLD AUTO: 8 K/UL
NEUTROPHILS NFR BLD AUTO: 74.8 %
PLATELET # BLD AUTO: 374 K/UL
POTASSIUM SERPL-SCNC: 4.6 MMOL/L
PROT SERPL-MCNC: 7.6 G/DL
RBC # BLD: 4.31 M/UL
RBC # FLD: 18.8 %
SODIUM SERPL-SCNC: 138 MMOL/L
TIBC SERPL-MCNC: 380 UG/DL
UIBC SERPL-MCNC: 370 UG/DL
VIT B12 SERPL-MCNC: 623 PG/ML
WBC # FLD AUTO: 10.7 K/UL

## 2022-01-12 ENCOUNTER — APPOINTMENT (OUTPATIENT)
Dept: GASTROENTEROLOGY | Facility: HOSPITAL | Age: 27
End: 2022-01-12

## 2022-01-14 ENCOUNTER — APPOINTMENT (OUTPATIENT)
Dept: HEMATOLOGY ONCOLOGY | Facility: CLINIC | Age: 27
End: 2022-01-14
Payer: COMMERCIAL

## 2022-01-14 VITALS
HEART RATE: 91 BPM | OXYGEN SATURATION: 100 % | HEIGHT: 73 IN | SYSTOLIC BLOOD PRESSURE: 122 MMHG | TEMPERATURE: 98.3 F | BODY MASS INDEX: 19.75 KG/M2 | DIASTOLIC BLOOD PRESSURE: 85 MMHG | WEIGHT: 149 LBS | RESPIRATION RATE: 91 BRPM

## 2022-01-14 DIAGNOSIS — D75.838 OTHER THROMBOCYTOSIS: ICD-10-CM

## 2022-01-14 PROCEDURE — 99205 OFFICE O/P NEW HI 60 MIN: CPT

## 2022-01-14 RX ORDER — CIPROFLOXACIN HYDROCHLORIDE 500 MG/1
500 TABLET, FILM COATED ORAL
Refills: 0 | Status: ACTIVE | COMMUNITY
Start: 2022-01-14

## 2022-01-14 NOTE — HISTORY OF PRESENT ILLNESS
[de-identified] : Mr. Calero is a 27 year old male with iron deficiency anemia.\par He has a significant medical history of UC/protocolectomy/Jpouch last surgery 4/2021 with ileostomy takedown; followed by Dr. Parnell @ [facility] and Ana Negron and Leigha @ [facility] ([phone]).  Last seen there 6/2021.\par Most recent bleeding episode 1/6/2022; hgb 8.1, iron sat 3%, ferritin 17\par Prior bleeding episode was >1 year ago. He previously took PO iron but caused nausea and needed blood transfusions in the past, and iron infusion\par \par colectomy/ ileostomy 11/2020, peritonitis in post op period\par Joupch 2/2021\par ileostomy takedown 4/2021\par s/p steroid dependence, remicade 10/2021\par \par He feels dizzy on a daily basis with lightheadedness, fatigue, palpitations, daily nausea, , and diarrhea.

## 2022-01-14 NOTE — ASSESSMENT
[FreeTextEntry1] : #WILMER\par We have reviewed the diagnosis of iron deficiency anemia.\par The most recent labs were reviewed personally and with the patient. \par Given fatigue, laboratory findings and inability to tolerate PO iron would recommend parenteral iron in the form of injectafer 750 mg x 2 one week apart. \par We have reviewed the side effects of injectafer to include but are not limited to hypophosphatemia, N/V,dizziness, infusion reactions, anaphylaxis, HA, rash.\par If injectafer is not approved will do venofer 200 mg IV x 5 doses\par \par #reactive thrombocytosis 2/2 WILMER\par \par #Colitis\par managed per Dr. Parnell\par \par RTC in 8 weeks with cbc with diff, cmp, iron, ferritin, ESR/CRP\par \par

## 2022-01-14 NOTE — CONSULT LETTER
[Dear  ___] : Dear  [unfilled], [Consult Letter:] : I had the pleasure of evaluating your patient, [unfilled]. [Please see my note below.] : Please see my note below. [Consult Closing:] : Thank you very much for allowing me to participate in the care of this patient.  If you have any questions, please do not hesitate to contact me. [Sincerely,] : Sincerely, [FreeTextEntry3] : Valeri Garcia DO, FACJOHANNA, FACP\par Medical Oncology and Hematology\par [facility] [place]\par

## 2022-02-04 ENCOUNTER — NON-APPOINTMENT (OUTPATIENT)
Age: 27
End: 2022-02-04

## 2022-02-04 ENCOUNTER — RESULT REVIEW (OUTPATIENT)
Age: 27
End: 2022-02-04

## 2022-02-12 ENCOUNTER — RESULT REVIEW (OUTPATIENT)
Age: 27
End: 2022-02-12

## 2022-02-14 ENCOUNTER — RESULT REVIEW (OUTPATIENT)
Age: 27
End: 2022-02-14

## 2022-02-15 ENCOUNTER — APPOINTMENT (OUTPATIENT)
Dept: GASTROENTEROLOGY | Facility: HOSPITAL | Age: 27
End: 2022-02-15

## 2022-02-15 RX ORDER — OMEPRAZOLE 40 MG/1
40 CAPSULE, DELAYED RELEASE ORAL
Qty: 30 | Refills: 2 | Status: ACTIVE | COMMUNITY
Start: 2022-02-15 | End: 1900-01-01

## 2022-02-28 DIAGNOSIS — D50.0 IRON DEFICIENCY ANEMIA SECONDARY TO BLOOD LOSS (CHRONIC): ICD-10-CM

## 2022-03-04 ENCOUNTER — RESULT REVIEW (OUTPATIENT)
Age: 27
End: 2022-03-04

## 2022-03-04 ENCOUNTER — APPOINTMENT (OUTPATIENT)
Dept: HEMATOLOGY ONCOLOGY | Facility: CLINIC | Age: 27
End: 2022-03-04
Payer: COMMERCIAL

## 2022-03-04 VITALS
RESPIRATION RATE: 16 BRPM | DIASTOLIC BLOOD PRESSURE: 82 MMHG | TEMPERATURE: 97.1 F | OXYGEN SATURATION: 99 % | SYSTOLIC BLOOD PRESSURE: 125 MMHG | HEART RATE: 79 BPM | BODY MASS INDEX: 19.81 KG/M2 | HEIGHT: 73 IN | WEIGHT: 149.5 LBS

## 2022-03-04 DIAGNOSIS — K52.9 NONINFECTIVE GASTROENTERITIS AND COLITIS, UNSPECIFIED: ICD-10-CM

## 2022-03-04 DIAGNOSIS — R71.8 OTHER ABNORMALITY OF RED BLOOD CELLS: ICD-10-CM

## 2022-03-04 DIAGNOSIS — K51.90 ULCERATIVE COLITIS, UNSPECIFIED, W/OUT COMPLICATIONS: ICD-10-CM

## 2022-03-04 DIAGNOSIS — D50.9 IRON DEFICIENCY ANEMIA, UNSPECIFIED: ICD-10-CM

## 2022-03-04 PROCEDURE — 99214 OFFICE O/P EST MOD 30 MIN: CPT | Mod: 25

## 2022-03-04 PROCEDURE — 36415 COLL VENOUS BLD VENIPUNCTURE: CPT

## 2022-03-04 NOTE — CONSULT LETTER
[Dear  ___] : Dear  [unfilled], [Consult Letter:] : I had the pleasure of evaluating your patient, [unfilled]. [Please see my note below.] : Please see my note below. [Consult Closing:] : Thank you very much for allowing me to participate in the care of this patient.  If you have any questions, please do not hesitate to contact me. [Sincerely,] : Sincerely, [FreeTextEntry3] : Valeri Garcia DO, FACJOHANNA, FACP\par Medical Oncology and Hematology\par [facility] [place]\par

## 2022-03-04 NOTE — HISTORY OF PRESENT ILLNESS
[de-identified] : Mr. Calero is a 27 year old male with iron deficiency anemia.\par He has a significant medical history of UC/protocolectomy/Jpouch last surgery 4/2021 with ileostomy takedown; followed by Dr. Parnell @ [place] and Ana Negron and Leigha @ [facility] ([phone]).  Last seen there 6/2021.\par Most recent bleeding episode 1/6/2022; hgb 8.1, iron sat 3%, ferritin 17\par Prior bleeding episode was >1 year ago. He previously took PO iron but caused nausea and needed blood transfusions in the past, and iron infusion\par \par colectomy/ ileostomy 11/2020, peritonitis in post op period\par Joupch 2/2021\par ileostomy takedown 4/2021\par s/p steroid dependence, remicade 10/2021\par \par He feels dizzy on a daily basis with lightheadedness, fatigue, palpitations, daily nausea, , and diarrhea. [de-identified] : Patient seen and examined and here today for follow up\par Hospitalized with GI bug - resolved\par Tolerated IV iron. Fever due to GI bug. Had total of 4 irons\par Endoscopy done on 2/15/22 - no H pylori, no inflammation in duodenum, gastic bc - chronic inflammation, esophagus - reactive changes

## 2022-03-04 NOTE — ASSESSMENT
[FreeTextEntry1] : #WILMER\par We have reviewed the diagnosis of iron deficiency anemia.\par The most recent labs were reviewed personally and with the patient. \par Given fatigue, laboratory findings and inability to tolerate PO iron would recommend parenteral iron in the form of injectafer 750 mg x 2 one week apart. \par We have reviewed the side effects of injectafer to include but are not limited to hypophosphatemia, N/V,dizziness, infusion reactions, anaphylaxis, HA, rash.\par If injectafer is not approved will do venofer 200 mg IV x 5 doses\par \par 3/4/22 - vs and labs reviewed. tolerated IV iron. hgb 11.8 (8.1) - pending iron and ferritin. Overall symptomatically feeling better\par \par #reactive thrombocytosis 2/2 WILMER - resolved\par \par #Colitis\par managed per Dr. Parnell\par EGD done 2/15/22 - reviewed\par MRE scheduled \par \par RTC in 12 weeks with cbc with diff, cmp, iron, ferritin, ESR/CRP\par \par

## 2022-03-16 ENCOUNTER — RESULT REVIEW (OUTPATIENT)
Age: 27
End: 2022-03-16

## 2022-04-29 ENCOUNTER — NON-APPOINTMENT (OUTPATIENT)
Age: 27
End: 2022-04-29

## 2022-04-30 ENCOUNTER — NON-APPOINTMENT (OUTPATIENT)
Age: 27
End: 2022-04-30

## 2022-06-03 ENCOUNTER — APPOINTMENT (OUTPATIENT)
Dept: HEMATOLOGY ONCOLOGY | Facility: CLINIC | Age: 27
End: 2022-06-03

## 2024-06-04 ENCOUNTER — NON-APPOINTMENT (OUTPATIENT)
Age: 29
End: 2024-06-04

## 2025-04-08 ENCOUNTER — NON-APPOINTMENT (OUTPATIENT)
Age: 30
End: 2025-04-08

## 2025-08-13 ENCOUNTER — APPOINTMENT (OUTPATIENT)
Dept: FAMILY MEDICINE | Facility: CLINIC | Age: 30
End: 2025-08-13

## 2025-09-09 ENCOUNTER — TRANSCRIPTION ENCOUNTER (OUTPATIENT)
Age: 30
End: 2025-09-09

## 2025-09-19 ENCOUNTER — APPOINTMENT (OUTPATIENT)
Dept: FAMILY MEDICINE | Facility: CLINIC | Age: 30
End: 2025-09-19